# Patient Record
Sex: FEMALE | Race: WHITE | Employment: OTHER | URBAN - METROPOLITAN AREA
[De-identification: names, ages, dates, MRNs, and addresses within clinical notes are randomized per-mention and may not be internally consistent; named-entity substitution may affect disease eponyms.]

---

## 2022-02-16 ENCOUNTER — HOSPITAL ENCOUNTER (OUTPATIENT)
Dept: GENERAL RADIOLOGY | Age: 72
Discharge: HOME | End: 2022-02-18
Payer: COMMERCIAL

## 2022-02-16 DIAGNOSIS — Z12.31 ENCOUNTER FOR SCREENING MAMMOGRAM FOR MALIGNANT NEOPLASM OF BREAST: ICD-10-CM

## 2022-02-16 PROCEDURE — 77067 SCR MAMMO BI INCL CAD: CPT

## 2022-08-22 ENCOUNTER — HOSPITAL ENCOUNTER (EMERGENCY)
Age: 72
Discharge: HOME | End: 2022-08-22
Payer: MEDICARE

## 2022-08-22 ENCOUNTER — APPOINTMENT (OUTPATIENT)
Dept: GENERAL RADIOLOGY | Age: 72
End: 2022-08-22
Payer: MEDICARE

## 2022-08-22 VITALS
WEIGHT: 135 LBS | SYSTOLIC BLOOD PRESSURE: 124 MMHG | OXYGEN SATURATION: 96 % | RESPIRATION RATE: 20 BRPM | DIASTOLIC BLOOD PRESSURE: 76 MMHG | HEART RATE: 83 BPM | TEMPERATURE: 98.3 F

## 2022-08-22 DIAGNOSIS — S20.219A CONTUSION OF RIB, UNSPECIFIED LATERALITY, INITIAL ENCOUNTER: Primary | ICD-10-CM

## 2022-08-22 PROCEDURE — 99211 OFF/OP EST MAY X REQ PHY/QHP: CPT

## 2022-08-22 PROCEDURE — 71101 X-RAY EXAM UNILAT RIBS/CHEST: CPT

## 2022-08-22 RX ORDER — LEVOTHYROXINE SODIUM 0.03 MG/1
25 TABLET ORAL DAILY
COMMUNITY

## 2022-08-22 ASSESSMENT — PAIN DESCRIPTION - ORIENTATION: ORIENTATION: RIGHT

## 2022-08-22 ASSESSMENT — PAIN DESCRIPTION - LOCATION: LOCATION: RIB CAGE

## 2022-08-22 ASSESSMENT — PAIN - FUNCTIONAL ASSESSMENT: PAIN_FUNCTIONAL_ASSESSMENT: 0-10

## 2022-08-22 ASSESSMENT — PAIN SCALES - GENERAL: PAINLEVEL_OUTOF10: 6

## 2022-08-22 ASSESSMENT — PAIN DESCRIPTION - DESCRIPTORS: DESCRIPTORS: SHARP;STABBING

## 2022-08-22 NOTE — ED PROVIDER NOTES
Department of Emergency [address] [facility]  Provider Note  Admit Date/Time: 2022  8:34 AM  Room:   NAME: Luis Antonio Beasley  : 1950  MRN: [id number]     Chief Complaint:  Rib Injury (Pt ran into a high counter on Saturday injuring right rib area. C/o pain)    History of Present Illness        Luis Antonio Beasley is a 71 y.o. female who has a past medical history of:   Past Medical History:   Diagnosis Date    Thyroid disease     presents to the urgent care center by private car for evaluation. As she was playing with her grandson chasing a balloon and accidentally ran into a counter she injured her right lateral rib cage area she is having pain with breathing and moving. She is not short of breath does not have any other injuries. This happened 2 days ago. ROS    Pertinent positives and negatives are stated within HPI, all other systems reviewed and are negative. Past Surgical History:   Procedure Laterality Date     SECTION     Social History:  reports that she has never smoked. She has been exposed to tobacco smoke. She has never used smokeless tobacco. She reports that she does not drink alcohol and does not use drugs. Family History: family history is not on file. Allergies: Patient has no known allergies. Physical Exam   Oxygen Saturation Interpretation: Normal.   ED Triage Vitals [22 0837]   BP Temp Temp src Heart Rate Resp SpO2 Height Weight   124/76 98.3 °F (36.8 °C) -- 83 20 96 % -- 135 lb (61.2 kg)       Physical Exam  Constitutional/General: Alert and oriented x3, well appearing, non toxic in NAD  HEENT:  NC/NT. Clear conjunctiva  Neck: Supple, full ROM,   Respiratory: Lungs clear to auscultation bilaterally, no wheezes, rales, or rhonchi. Not in respiratory distress  CV:  Regular rate. Regular rhythm. No murmurs, gallops, or rubs.  2+ distal pulses  Chest: No chest wall tenderness, is not tender where she has the rib injury she does have bruising there on the lateral rib cage area  GI:  Abdomen Soft, Non tender, Non distended. +BS. No rebound, guarding, or rigidity. No pulsatile masses. Musculoskeletal: Moves all extremities x 4. Warm and well perfused, no clubbing, cyanosis, or edema. Capillary refill <3 seconds  Integument: skin warm and dry. No rashes. Lymphatic: no lymphadenopathy noted  Neurologic: GCS 15, no focal deficits, symmetric strength 5/5 in the upper and lower extremities bilaterally  Psychiatric: Normal Affect    Lab / Imaging Results   (All laboratory and radiology results have been personally reviewed by myself)  Labs:  No results found for this visit on 08/22/22. Imaging: All Radiology results interpreted by Radiologist unless otherwise noted. XR RIBS RIGHT INCLUDE CHEST (MIN 3 VIEWS)   Final Result   No fracture or dislocation. Upper thoracic scoliosis again seen, with   convexity to the left, when compared to the prior chest radiographs performed   09/25/2012. ED Course / Medical Decision Making   Medications - No data to display       Consult(s):   None        MDM:   Xray of her ribs was done and there were no fractures being seen. She does have the scoliosis and she is aware of that. He says she does not want anything for pain she will just take Tylenol or ibuprofen she does want to make sure there were no fractures she was advised to follow-up with her doctor if she has any further symptoms such as shortness of breath or worsening pain she should get reevaluated    Plan of Care/Counseling:  I reviewed today's visit with the patient in addition to providing specific details for the plan of care and counseling regarding the diagnosis and prognosis. Questions are answered at this time and are agreeable with the plan. Assessment      1.  Contusion of rib, unspecified laterality, initial encounter      Plan   Discharge to home and advised to contact Michelle Rolle MD  [address] [facility] [address] [phone]      As needed Patient condition is good    New Medications     New Prescriptions    No medications on file     Electronically signed by CORWIN Kelly CNP   DD: 8/22/22  **This report was transcribed using voice recognition software. Every effort was made to ensure accuracy; however, inadvertent computerized transcription errors may be present.   END OF ED PROVIDER NOTE      CORWIN Kelly CNP  08/22/22 [id number]

## 2023-04-18 ENCOUNTER — HOSPITAL ENCOUNTER (OUTPATIENT)
Dept: GENERAL RADIOLOGY | Age: 73
Discharge: HOME | End: 2023-04-20
Payer: MEDICARE

## 2023-04-18 ENCOUNTER — TELEPHONE (OUTPATIENT)
Dept: GENERAL RADIOLOGY | Age: 73
End: 2023-04-18

## 2023-04-18 DIAGNOSIS — R92.8 ABNORMAL MAMMOGRAM: Primary | ICD-10-CM

## 2023-04-18 DIAGNOSIS — Z12.31 ENCOUNTER FOR SCREENING MAMMOGRAM FOR MALIGNANT NEOPLASM OF BREAST: ICD-10-CM

## 2023-04-18 PROCEDURE — 77063 BREAST TOMOSYNTHESIS BI: CPT

## 2023-04-18 NOTE — TELEPHONE ENCOUNTER
Call to patient in reference to her mammogram performed at [facility] on April 18, 2023. Instructed patient that the radiologist has recommended some additional breast imaging in order to make a final determination/result. Informed her that a Rx has been obtained by the ordering physician. Next, a  from [facility] will contact her to schedule the additional imaging study/studies. Verbalizes understanding and is agreeable to proceed.

## 2023-04-24 ENCOUNTER — HOSPITAL ENCOUNTER (OUTPATIENT)
Dept: GENERAL RADIOLOGY | Age: 73
Discharge: HOME | End: 2023-04-26
Payer: MEDICARE

## 2023-04-24 ENCOUNTER — APPOINTMENT (OUTPATIENT)
Dept: GENERAL RADIOLOGY | Age: 73
End: 2023-04-24
Payer: MEDICARE

## 2023-04-24 DIAGNOSIS — R92.8 ABNORMAL MAMMOGRAM: ICD-10-CM

## 2023-04-24 PROCEDURE — 77065 DX MAMMO INCL CAD UNI: CPT

## 2023-04-24 PROCEDURE — G0279 TOMOSYNTHESIS, MAMMO: HCPCS

## 2024-03-28 ENCOUNTER — HOSPITAL ENCOUNTER (EMERGENCY)
Age: 74
Discharge: HOME | End: 2024-03-28
Payer: MEDICARE

## 2024-03-28 ENCOUNTER — APPOINTMENT (OUTPATIENT)
Dept: CT IMAGING | Age: 74
End: 2024-03-28
Payer: MEDICARE

## 2024-03-28 VITALS
RESPIRATION RATE: 18 BRPM | OXYGEN SATURATION: 97 % | SYSTOLIC BLOOD PRESSURE: 160 MMHG | DIASTOLIC BLOOD PRESSURE: 83 MMHG | HEART RATE: 102 BPM | TEMPERATURE: 98.2 F

## 2024-03-28 DIAGNOSIS — S00.83XA FACIAL CONTUSION, INITIAL ENCOUNTER: ICD-10-CM

## 2024-03-28 DIAGNOSIS — S01.81XA FOREHEAD LACERATION, INITIAL ENCOUNTER: Primary | ICD-10-CM

## 2024-03-28 PROCEDURE — 72125 CT NECK SPINE W/O DYE: CPT

## 2024-03-28 PROCEDURE — 90471 IMMUNIZATION ADMIN: CPT | Performed by: PHYSICIAN ASSISTANT

## 2024-03-28 PROCEDURE — 70486 CT MAXILLOFACIAL W/O DYE: CPT

## 2024-03-28 PROCEDURE — 6360000002 HC RX W HCPCS: Performed by: PHYSICIAN ASSISTANT

## 2024-03-28 PROCEDURE — 70450 CT HEAD/BRAIN W/O DYE: CPT

## 2024-03-28 PROCEDURE — 12001 RPR S/N/AX/GEN/TRNK 2.5CM/<: CPT

## 2024-03-28 PROCEDURE — 2500000003 HC RX 250 WO HCPCS: Performed by: PHYSICIAN ASSISTANT

## 2024-03-28 PROCEDURE — 90715 TDAP VACCINE 7 YRS/> IM: CPT | Performed by: PHYSICIAN ASSISTANT

## 2024-03-28 PROCEDURE — 99212 OFFICE O/P EST SF 10 MIN: CPT

## 2024-03-28 RX ORDER — LIDOCAINE HYDROCHLORIDE 10 MG/ML
5 INJECTION, SOLUTION INFILTRATION; PERINEURAL ONCE
Status: COMPLETED | OUTPATIENT
Start: 2024-03-28 | End: 2024-03-28

## 2024-03-28 RX ADMIN — TETANUS TOXOID, REDUCED DIPHTHERIA TOXOID AND ACELLULAR PERTUSSIS VACCINE, ADSORBED 0.5 ML: 5; 2.5; 8; 8; 2.5 SUSPENSION INTRAMUSCULAR at 15:54

## 2024-03-28 RX ADMIN — LIDOCAINE HYDROCHLORIDE 5 ML: 10 INJECTION, SOLUTION INFILTRATION; PERINEURAL at 15:54

## 2024-03-28 ASSESSMENT — VISUAL ACUITY: OU: 1

## 2024-03-28 NOTE — ED PROVIDER NOTES
[facility] URGENT CARE  EMERGENCY DEPARTMENT ENCOUNTER        NAME: Roxane Zhao  :  1950  MRN:  [id number]  Date of evaluation: 3/28/2024  Provider: Sandeep Sapp PA-C  PCP: Terry Amin MD  Note Started : 3:32 PM EDT 3/28/24    Chief Complaint: Fall (Fell in her bath tub while cleaning it  to day and hit face   has a lac  over left eye brow  715 am   no loc )      This is a 73-year-old female that presents to urgent care stating that she hit her left side of her head today while cleaning her bathtub today.  She denies any loss of consciousness.  She denies any lightheadedness or dizziness.  No numbness or tingling or weakness.  She does complain of some facial pain especially left cheek.  She does have a laceration to her left forehead region.  She denies any loss of vision or blurred vision.  On first contact patient she appears to be in no acute distress.  Patient states she is not on any blood thinners.  Patient states this fall happened this morning and then shortly afterwards she went to play 3 games of bowling.      Review of Systems  Pertinent positives and negatives are stated within HPI, all other systems reviewed and are negative.     Allergies: Patient has no known allergies.     --------------------------------------------- PAST HISTORY ---------------------------------------------  Past Medical History:  has a past medical history of Thyroid disease.    Past Surgical History:  has a past surgical history that includes  section.    Social History:  reports that she has never smoked. She has been exposed to tobacco smoke. She has never used smokeless tobacco. She reports that she does not drink alcohol and does not use drugs.    Family History: family history is not on file.     The patient’s home medications have been reviewed.    The nursing notes within the ED encounter have been reviewed.

## 2024-03-28 NOTE — DISCHARGE INSTRUCTIONS
5 sutures out in 5 to 6 days  Keep clean  Antibiotic ointment and bandage daily until healed.  Tylenol 650 to 1000 mg every 8 hours as needed for pain/fever.

## 2024-05-08 ENCOUNTER — HOSPITAL ENCOUNTER (OUTPATIENT)
Dept: GENERAL RADIOLOGY | Age: 74
Discharge: HOME | End: 2024-05-10
Payer: MEDICARE

## 2024-05-08 VITALS — WEIGHT: 134 LBS | HEIGHT: 67 IN | BODY MASS INDEX: 21.03 KG/M2

## 2024-05-08 DIAGNOSIS — Z12.31 VISIT FOR SCREENING MAMMOGRAM: ICD-10-CM

## 2024-05-08 PROCEDURE — 77063 BREAST TOMOSYNTHESIS BI: CPT

## 2024-08-17 ENCOUNTER — HOSPITAL ENCOUNTER (EMERGENCY)
Age: 74
Discharge: HOME | End: 2024-08-17
Attending: EMERGENCY MEDICINE
Payer: MEDICARE

## 2024-08-17 ENCOUNTER — APPOINTMENT (OUTPATIENT)
Dept: GENERAL RADIOLOGY | Age: 74
End: 2024-08-17
Payer: MEDICARE

## 2024-08-17 ENCOUNTER — APPOINTMENT (OUTPATIENT)
Dept: CT IMAGING | Age: 74
End: 2024-08-17
Payer: MEDICARE

## 2024-08-17 VITALS
WEIGHT: 127 LBS | OXYGEN SATURATION: 97 % | RESPIRATION RATE: 18 BRPM | BODY MASS INDEX: 19.89 KG/M2 | TEMPERATURE: 97.2 F | DIASTOLIC BLOOD PRESSURE: 90 MMHG | SYSTOLIC BLOOD PRESSURE: 138 MMHG | HEART RATE: 98 BPM

## 2024-08-17 DIAGNOSIS — I10 HYPERTENSION, UNSPECIFIED TYPE: Primary | ICD-10-CM

## 2024-08-17 LAB
ALBUMIN SERPL-MCNC: 4.7 G/DL (ref 3.5–5.2)
ALP SERPL-CCNC: 72 U/L (ref 35–104)
ALT SERPL-CCNC: 47 U/L (ref 0–32)
ANION GAP SERPL CALCULATED.3IONS-SCNC: 17 MMOL/L (ref 7–16)
AST SERPL-CCNC: 49 U/L (ref 0–31)
BASOPHILS # BLD: 0.06 K/UL (ref 0–0.2)
BASOPHILS NFR BLD: 1 % (ref 0–2)
BILIRUB SERPL-MCNC: 0.7 MG/DL (ref 0–1.2)
BILIRUB UR QL STRIP: NEGATIVE
BUN SERPL-MCNC: 10 MG/DL (ref 6–23)
CALCIUM SERPL-MCNC: 9.5 MG/DL (ref 8.6–10.2)
CHLORIDE SERPL-SCNC: 95 MMOL/L (ref 98–107)
CLARITY UR: CLEAR
CO2 SERPL-SCNC: 26 MMOL/L (ref 22–29)
COLOR UR: YELLOW
CREAT SERPL-MCNC: 0.6 MG/DL (ref 0.5–1)
D-DIMER QUANTITATIVE: 302 NG/ML DDU (ref 0–230)
EOSINOPHIL # BLD: 0.04 K/UL (ref 0.05–0.5)
EOSINOPHILS RELATIVE PERCENT: 1 % (ref 0–6)
ERYTHROCYTE [DISTWIDTH] IN BLOOD BY AUTOMATED COUNT: 14.1 % (ref 11.5–15)
GFR, ESTIMATED: >90 ML/MIN/1.73M2
GLUCOSE SERPL-MCNC: 105 MG/DL (ref 74–99)
GLUCOSE UR STRIP-MCNC: NEGATIVE MG/DL
HCT VFR BLD AUTO: 41.3 % (ref 34–48)
HGB BLD-MCNC: 14.1 G/DL (ref 11.5–15.5)
HGB UR QL STRIP.AUTO: NEGATIVE
IMM GRANULOCYTES # BLD AUTO: 0.03 K/UL (ref 0–0.58)
IMM GRANULOCYTES NFR BLD: 0 % (ref 0–5)
KETONES UR STRIP-MCNC: ABNORMAL MG/DL
LEUKOCYTE ESTERASE UR QL STRIP: NEGATIVE
LYMPHOCYTES NFR BLD: 1.22 K/UL (ref 1.5–4)
LYMPHOCYTES RELATIVE PERCENT: 14 % (ref 20–42)
MCH RBC QN AUTO: 34.1 PG (ref 26–35)
MCHC RBC AUTO-ENTMCNC: 34.1 G/DL (ref 32–34.5)
MCV RBC AUTO: 100 FL (ref 80–99.9)
MONOCYTES NFR BLD: 0.63 K/UL (ref 0.1–0.95)
MONOCYTES NFR BLD: 7 % (ref 2–12)
NEUTROPHILS NFR BLD: 77 % (ref 43–80)
NEUTS SEG NFR BLD: 6.63 K/UL (ref 1.8–7.3)
NITRITE UR QL STRIP: NEGATIVE
PH UR STRIP: 6 [PH] (ref 5–9)
PLATELET # BLD AUTO: 199 K/UL (ref 130–450)
PMV BLD AUTO: 11 FL (ref 7–12)
POTASSIUM SERPL-SCNC: 3.9 MMOL/L (ref 3.5–5)
PROT SERPL-MCNC: 7.8 G/DL (ref 6.4–8.3)
PROT UR STRIP-MCNC: NEGATIVE MG/DL
RBC # BLD AUTO: 4.13 M/UL (ref 3.5–5.5)
RBC #/AREA URNS HPF: ABNORMAL /HPF
SODIUM SERPL-SCNC: 138 MMOL/L (ref 132–146)
SP GR UR STRIP: <1.005 (ref 1–1.03)
T4 FREE SERPL-MCNC: 0.9 NG/DL (ref 0.9–1.7)
TROPONIN I SERPL HS-MCNC: 7 NG/L (ref 0–9)
TSH SERPL DL<=0.05 MIU/L-ACNC: 1.37 UIU/ML (ref 0.27–4.2)
UROBILINOGEN UR STRIP-ACNC: 0.2 EU/DL (ref 0–1)
WBC #/AREA URNS HPF: ABNORMAL /HPF
WBC OTHER # BLD: 8.6 K/UL (ref 4.5–11.5)

## 2024-08-17 PROCEDURE — 71275 CT ANGIOGRAPHY CHEST: CPT

## 2024-08-17 PROCEDURE — 93005 ELECTROCARDIOGRAM TRACING: CPT

## 2024-08-17 PROCEDURE — 2580000003 HC RX 258

## 2024-08-17 PROCEDURE — 6360000004 HC RX CONTRAST MEDICATION: Performed by: RADIOLOGY

## 2024-08-17 PROCEDURE — 81001 URINALYSIS AUTO W/SCOPE: CPT

## 2024-08-17 PROCEDURE — 84439 ASSAY OF FREE THYROXINE: CPT

## 2024-08-17 PROCEDURE — 85025 COMPLETE CBC W/AUTO DIFF WBC: CPT

## 2024-08-17 PROCEDURE — 99285 EMERGENCY DEPT VISIT HI MDM: CPT

## 2024-08-17 PROCEDURE — 84484 ASSAY OF TROPONIN QUANT: CPT

## 2024-08-17 PROCEDURE — 85379 FIBRIN DEGRADATION QUANT: CPT

## 2024-08-17 PROCEDURE — 84443 ASSAY THYROID STIM HORMONE: CPT

## 2024-08-17 PROCEDURE — 71045 X-RAY EXAM CHEST 1 VIEW: CPT

## 2024-08-17 PROCEDURE — 80053 COMPREHEN METABOLIC PANEL: CPT

## 2024-08-17 RX ORDER — 0.9 % SODIUM CHLORIDE 0.9 %
1000 INTRAVENOUS SOLUTION INTRAVENOUS ONCE
Status: COMPLETED | OUTPATIENT
Start: 2024-08-17 | End: 2024-08-17

## 2024-08-17 RX ADMIN — SODIUM CHLORIDE 1000 ML: 9 INJECTION, SOLUTION INTRAVENOUS at 16:31

## 2024-08-17 RX ADMIN — IOPAMIDOL 75 ML: 755 INJECTION, SOLUTION INTRAVENOUS at 17:46

## 2024-08-17 ASSESSMENT — LIFESTYLE VARIABLES
HOW OFTEN DO YOU HAVE A DRINK CONTAINING ALCOHOL: 4 OR MORE TIMES A WEEK
HOW MANY STANDARD DRINKS CONTAINING ALCOHOL DO YOU HAVE ON A TYPICAL DAY: 1 OR 2

## 2024-08-17 NOTE — DISCHARGE INSTRUCTIONS
Please call and follow-up with your family physician as soon as possible.  Return to emergency department if symptoms persist or worsen.  Continue to exercise as tolerated and drink plenty of clear fluids.

## 2024-08-17 NOTE — ED PROVIDER NOTES
[facility] EMERGENCY DEPARTMENT  EMERGENCY DEPARTMENT ENCOUNTER        Pt Name: Roxane Zhao  MRN: [id number]  Birthdate 1950  Date of evaluation: 2024  Provider: Jonh Tamayo DO  PCP: Terry Amin MD  Note Started: 4:39 PM EDT 24    CHIEF COMPLAINT       Chief Complaint   Patient presents with    Hypertension     Pt complaint of feeling clammy and having high blood pressure this morning.       HISTORY OF PRESENT ILLNESS: 1 or more Elements   History received from: Patient    Roxane Zhao is a 74 y.o. female who presents to the emergency department with chief complaint of hypertension.  Patient states that since this morning she has felt a little bit clammy in her hands and noticed that her blood pressure was high and her heart was racing.  She states that otherwise she feels quite normal and has no other symptoms.  She has not had issues like this before.  Patient states that otherwise she is feeling well and has no other significant symptoms.  Denies any fever, chills, nausea, vomiting, chest pain, shortness of breath, abdominal pain, hematuria or dysuria, constipation or diarrhea.    Nursing Notes were all reviewed and agreed with or any disagreements were addressed in the HPI.    REVIEW OF SYSTEMS :    Positives and Pertinent negatives as per HPI.    PAST MEDICAL HISTORY/Chronic Conditions Affecting Care    has a past medical history of Thyroid disease.     SURGICAL HISTORY     Past Surgical History:   Procedure Laterality Date     SECTION         CURRENTMEDICATIONS       Discharge Medication List as of 2024  6:56 PM        CONTINUE these medications which have NOT CHANGED    Details   levothyroxine (SYNTHROID) 25 MCG tablet Take 25 mcg by mouth DailyHistorical Med      brompheniramine-pseudoephedrine-DM 30-2-10 MG/5ML syrup Take 5 mLs by mouth 4 times daily as needed., Disp-120 mL, R-0      escitalopram (LEXAPRO) 10 MG tablet Take 10 mg by  All other components within normal limits   COMPREHENSIVE METABOLIC PANEL - Abnormal; Notable for the following components:    Chloride 95 (*)     Anion Gap 17 (*)     Glucose 105 (*)     ALT 47 (*)     AST 49 (*)     All other components within normal limits   D-DIMER, QUANTITATIVE - Abnormal; Notable for the following components:    D-Dimer, Quant 302 (*)     All other components within normal limits   URINALYSIS WITH MICROSCOPIC - Abnormal; Notable for the following components:    Ketones, Urine TRACE (*)     Specific Gravity, UA <1.005 (*)     All other components within normal limits   TROPONIN   TSH   T4, FREE       As interpreted by me, the above displayed labs are abnormal. All other labs obtained during this visit were within normal range or not returned as of this dictation.    EKG Interpretation  Interpreted by emergency department resident physician, Jonh Tamayo DO  *Please see ED Course for EKG interpretation*    RADIOLOGY:   Non-plain film images such as CT, Ultrasound and MRI are read by the radiologist. Plain radiographic images are visualized and preliminarily interpreted by the ED Provider with the below findings:    My interpretation of chest x-ray: No significant pneumonia, pneumothorax, acute rib fractures, or mediastinal widening noted.    Interpretation per the Radiologist below, if available at the time of this note:    CTA PULMONARY W CONTRAST   Final Result   1. No evidence of pulmonary embolism or acute pulmonary abnormality.   2. Mild diffuse centrilobular emphysema.   3. Moderate scoliosis of the thoracolumbar junction convex towards the right.         XR CHEST 1 VIEW   Final Result   No acute cardiopulmonary process.             PROCEDURES   Unless otherwise noted below, none      Medical Decision Making/Differential Diagnosis:   CC/HPI Summary, DDx, ED Course, Reassessment, Tests Considered, Patient expectation:   74 y.o. female who presents to the emergency department with chief

## 2024-08-18 LAB
EKG ATRIAL RATE: 109 BPM
EKG P AXIS: 79 DEGREES
EKG P-R INTERVAL: 150 MS
EKG Q-T INTERVAL: 330 MS
EKG QRS DURATION: 52 MS
EKG QTC CALCULATION (BAZETT): 444 MS
EKG R AXIS: 96 DEGREES
EKG T AXIS: 80 DEGREES
EKG VENTRICULAR RATE: 109 BPM

## 2024-08-18 PROCEDURE — 93010 ELECTROCARDIOGRAM REPORT: CPT | Performed by: INTERNAL MEDICINE

## 2025-06-11 ENCOUNTER — HOSPITAL ENCOUNTER (OUTPATIENT)
Dept: GENERAL RADIOLOGY | Age: 75
Discharge: HOME | End: 2025-06-13
Payer: MEDICARE

## 2025-06-11 VITALS — BODY MASS INDEX: 19.93 KG/M2 | HEIGHT: 67 IN | WEIGHT: 127 LBS

## 2025-06-11 DIAGNOSIS — Z12.31 VISIT FOR SCREENING MAMMOGRAM: ICD-10-CM

## 2025-06-11 PROCEDURE — 77063 BREAST TOMOSYNTHESIS BI: CPT
